# Patient Record
Sex: MALE | Race: WHITE | NOT HISPANIC OR LATINO | Employment: FULL TIME | URBAN - METROPOLITAN AREA
[De-identification: names, ages, dates, MRNs, and addresses within clinical notes are randomized per-mention and may not be internally consistent; named-entity substitution may affect disease eponyms.]

---

## 2021-08-11 ENCOUNTER — LAB (OUTPATIENT)
Dept: LAB | Facility: CLINIC | Age: 46
End: 2021-08-11

## 2021-08-11 DIAGNOSIS — M25.562 LEFT KNEE PAIN: Primary | ICD-10-CM

## 2021-08-11 LAB
APPEARANCE FLD: ABNORMAL
COLOR FLD: ABNORMAL
CRYSTALS SNV MICRO: NORMAL
EOSINOPHIL NFR FLD MANUAL: 2 %
GRAM STAIN RESULT: NORMAL
GRAM STAIN RESULT: NORMAL
LYMPHOCYTES NFR FLD MANUAL: 56 %
MONOS+MACROS NFR FLD MANUAL: 11 %
NEUTS BAND NFR FLD MANUAL: 31 %
RBC # FLD: ABNORMAL /UL
WBC # FLD AUTO: 272 /UL

## 2021-08-11 PROCEDURE — 87070 CULTURE OTHR SPECIMN AEROBIC: CPT

## 2021-08-11 PROCEDURE — 86618 LYME DISEASE ANTIBODY: CPT

## 2021-08-11 PROCEDURE — 89051 BODY FLUID CELL COUNT: CPT

## 2021-08-11 PROCEDURE — 87205 SMEAR GRAM STAIN: CPT

## 2021-08-11 PROCEDURE — 87075 CULTR BACTERIA EXCEPT BLOOD: CPT

## 2021-08-11 PROCEDURE — 89060 EXAM SYNOVIAL FLUID CRYSTALS: CPT | Mod: TC

## 2021-08-12 LAB — B BURGDOR IGG+IGM SER QL: 0.02

## 2021-08-14 LAB
BACTERIA FLD CULT: NO GROWTH
BACTERIA FLD CULT: NORMAL

## 2024-01-12 ENCOUNTER — HOSPITAL ENCOUNTER (EMERGENCY)
Facility: CLINIC | Age: 49
Discharge: HOME | End: 2024-01-12
Attending: EMERGENCY MEDICINE | Admitting: EMERGENCY MEDICINE
Payer: OTHER MISCELLANEOUS

## 2024-01-12 VITALS
DIASTOLIC BLOOD PRESSURE: 127 MMHG | SYSTOLIC BLOOD PRESSURE: 189 MMHG | RESPIRATION RATE: 16 BRPM | TEMPERATURE: 97.3 F | OXYGEN SATURATION: 93 % | WEIGHT: 302 LBS | HEIGHT: 70 IN | HEART RATE: 87 BPM | BODY MASS INDEX: 43.23 KG/M2

## 2024-01-12 DIAGNOSIS — S68.119A FINGER AMPUTATION, TRAUMATIC, INITIAL ENCOUNTER: ICD-10-CM

## 2024-01-12 DIAGNOSIS — I10 PRIMARY HYPERTENSION: ICD-10-CM

## 2024-01-12 PROCEDURE — 99284 EMERGENCY DEPT VISIT MOD MDM: CPT | Mod: 25 | Performed by: EMERGENCY MEDICINE

## 2024-01-12 PROCEDURE — 64450 NJX AA&/STRD OTHER PN/BRANCH: CPT | Performed by: EMERGENCY MEDICINE

## 2024-01-12 PROCEDURE — 250N000013 HC RX MED GY IP 250 OP 250 PS 637: Performed by: EMERGENCY MEDICINE

## 2024-01-12 RX ORDER — METFORMIN HCL 500 MG
500 TABLET, EXTENDED RELEASE 24 HR ORAL 2 TIMES DAILY WITH MEALS
Qty: 60 TABLET | Refills: 0 | Status: SHIPPED | OUTPATIENT
Start: 2024-01-12 | End: 2024-02-11

## 2024-01-12 RX ORDER — ATORVASTATIN CALCIUM 10 MG/1
10 TABLET, FILM COATED ORAL DAILY
Qty: 30 TABLET | Refills: 0 | Status: SHIPPED | OUTPATIENT
Start: 2024-01-12 | End: 2024-02-11

## 2024-01-12 RX ORDER — CHLORTHALIDONE 25 MG/1
25 TABLET ORAL DAILY
Qty: 30 TABLET | Refills: 0 | Status: SHIPPED | OUTPATIENT
Start: 2024-01-12 | End: 2024-02-11

## 2024-01-12 RX ORDER — ACETAMINOPHEN 500 MG
1000 TABLET ORAL
Status: COMPLETED | OUTPATIENT
Start: 2024-01-12 | End: 2024-01-12

## 2024-01-12 RX ORDER — HYDROMORPHONE HYDROCHLORIDE 2 MG/1
2 TABLET ORAL EVERY 6 HOURS PRN
Qty: 10 TABLET | Refills: 0 | Status: SHIPPED | OUTPATIENT
Start: 2024-01-12 | End: 2024-01-15

## 2024-01-12 RX ORDER — DOXYCYCLINE 100 MG/1
100 TABLET ORAL 2 TIMES DAILY
Qty: 14 TABLET | Refills: 0 | Status: SHIPPED | OUTPATIENT
Start: 2024-01-12 | End: 2024-01-19

## 2024-01-12 RX ADMIN — IBUPROFEN 600 MG: 400 TABLET ORAL at 09:25

## 2024-01-12 RX ADMIN — OXYCODONE HYDROCHLORIDE 2.5 MG: 5 TABLET ORAL at 11:02

## 2024-01-12 RX ADMIN — ACETAMINOPHEN 1000 MG: 500 TABLET, FILM COATED ORAL at 11:03

## 2024-01-12 ASSESSMENT — ENCOUNTER SYMPTOMS
GASTROINTESTINAL NEGATIVE: 1
NEUROLOGICAL NEGATIVE: 1
HEMATOLOGIC/LYMPHATIC NEGATIVE: 1
RESPIRATORY NEGATIVE: 1
ENDOCRINE NEGATIVE: 1
ALLERGIC/IMMUNOLOGIC NEGATIVE: 1
CONSTITUTIONAL NEGATIVE: 1
CARDIOVASCULAR NEGATIVE: 1
PSYCHIATRIC NEGATIVE: 1
EYES NEGATIVE: 1

## 2024-01-12 ASSESSMENT — ACTIVITIES OF DAILY LIVING (ADL): ADLS_ACUITY_SCORE: 35

## 2024-01-12 NOTE — DISCHARGE INSTRUCTIONS
1) You have amputated the distal tip of the left index finger in its entirety.  We discussed that reattachment of the tissue and nail was not practical.  After cleaning the wound and controlling bleeding to gauze dressing has been applied which should change at least daily.    2) For home you are placed on oral antibiotics to take twice a day over the next 1 week and pain medication as needed.  You can use Tylenol 1000 mg every 8 hours if needed and Motrin ibuprofen if able with food for 100 mg every 12 hours he also given pain medication Dilaudid to use every 6-8 hours if needed.    3) We have reviewed the importance of keeping the finger elevated.  To help facilitate wound care and follow-up care referral has been placed to the orthopedic hand surgery clinic for further assessment the next 1 to 2 weeks.    4) With your report that you have not been taking prescribed medications over the last 7 months he restarted any metformin currently the plan is for you to take 500 mg twice a day (with a plan to ramp up to 1000 mg twice a day however this should be done in the next 2 weeks after you have seen your clinic provider).  We also started on chlorthalidone and blood pressure medications to take 25 mg once a day and atorvastatin to take 10 mg once a day.  A primary care referral was placed for you to have follow-up in the next 2 to 3 weeks for medication refills and for follow-up care as discussed

## 2024-01-12 NOTE — LETTER
January 12, 2024      To Whom It May Concern:      Toro Juarez was seen in our Emergency Department today, 01/12/24. Please excuse him from missing work due to his injury needing to be evaluated the emergency department.  Further follow-up care is recommended and plan the next 1 to 2 weeks.  In the event he is unable to return to work his work note is valid until 1/24/24.  If there are new concerns or symptoms worsen he may need to return to be reevaluated.    Sincerely,             Phong Jaquez MD

## 2024-01-12 NOTE — ED TRIAGE NOTES
Pt states cut tip of finger off at work with metal sleeve.  Left pointer finger.  Bleeding controlled with pressure.    RN redressed wound and applied pressure.       Triage Assessment (Adult)       Row Name 01/12/24 0915          Triage Assessment    Airway WDL WDL        Respiratory WDL    Respiratory WDL WDL        Peripheral/Neurovascular WDL    Peripheral Neurovascular WDL WDL

## 2024-01-12 NOTE — ED PROVIDER NOTES
History     Chief Complaint   Patient presents with    Laceration     Pt states cut tip of finger off at work with metal sleeve.  Left pointer finger.  Bleeding controlled with pressure.      HPI  Toro Juarez is a 48 year old male who presented for care after sustaining a finger laceration.  On intake patient reports amputating the tip of his left index finger with a metal sleeve.    On examination patient reports that he was at a job site when he accidentally got his left index finger got caught in a metal sleeve at the job site.  Due to amputation bleeding he presented for wound care and further management Patient reports a history of hypertension on chlorthalidone , diabetes on metformin.  Patient reports that he has not been compliant with his prescribed medications due to life stressors including going through a divorce since April 2023.    Allergies:  Allergies   Allergen Reactions    Penicillins Swelling       Problem List:    There are no problems to display for this patient.       Past Medical History:    No past medical history on file.    Past Surgical History:    No past surgical history on file.    Family History:    No family history on file.    Social History:  Marital Status:   [4]  Social History     Tobacco Use    Smoking status: Former    Tobacco comments:     2003   Substance Use Topics    Alcohol use: Yes    Drug use: No        Medications:    atorvastatin (LIPITOR) 10 MG tablet  chlorthalidone (HYGROTON) 25 MG tablet  doxycycline monohydrate (ADOXA) 100 MG tablet  HYDROmorphone (DILAUDID) 2 MG tablet  metFORMIN (GLUCOPHAGE XR) 500 MG 24 hr tablet  ZYRTEC 10 MG OR TABS          Review of Systems   Constitutional: Negative.    HENT: Negative.     Eyes: Negative.    Respiratory: Negative.     Cardiovascular: Negative.    Gastrointestinal: Negative.    Endocrine: Negative.    Genitourinary: Negative.    Musculoskeletal:         Finger laceration with avulsion of distal tip   Skin:  "Negative.    Allergic/Immunologic: Negative.    Neurological: Negative.    Hematological: Negative.    Psychiatric/Behavioral: Negative.     All other systems reviewed and are negative.      Physical Exam   BP: (!) 224/125  Pulse: 87  Temp: 97.3  F (36.3  C)  Resp: 16  Height: 176.5 cm (5' 9.5\")  Weight: 137 kg (302 lb)  SpO2: 93 %      Physical Exam  Constitutional:       General: He is not in acute distress.     Appearance: He is not ill-appearing, toxic-appearing or diaphoretic.   HENT:      Head: Normocephalic and atraumatic.      Nose: Nose normal.   Eyes:      Extraocular Movements: Extraocular movements intact.      Pupils: Pupils are equal, round, and reactive to light.   Cardiovascular:      Rate and Rhythm: Normal rate and regular rhythm.   Musculoskeletal:         General: Swelling, tenderness and signs of injury present.      Left hand: Laceration (avulsed the distal tip of the left index finger including the nail in its entirety) present.        Arms:       Cervical back: Normal range of motion and neck supple.   Skin:     Capillary Refill: Capillary refill takes less than 2 seconds.      Coloration: Skin is not jaundiced or pale.      Findings: No bruising, erythema, lesion or rash.   Neurological:      General: No focal deficit present.      Mental Status: He is alert and oriented to person, place, and time.      Cranial Nerves: No cranial nerve deficit.      Sensory: No sensory deficit.      Motor: No weakness.      Coordination: Coordination normal.      Gait: Gait normal.      Deep Tendon Reflexes: Reflexes normal.   Psychiatric:         Mood and Affect: Mood normal.         Behavior: Behavior normal.         Thought Content: Thought content normal.         Judgment: Judgment normal.         ED Course                 Procedures              Critical Care time:  none             ED medications:  Medications   ibuprofen (ADVIL/MOTRIN) tablet 600 mg (600 mg Oral $Given 1/12/24 8152)   acetaminophen " "(TYLENOL) tablet 1,000 mg (1,000 mg Oral $Given 1/12/24 1103)   oxyCODONE IR (ROXICODONE) half-tab 2.5 mg (2.5 mg Oral $Given 1/12/24 1102)     ED Vitals:  Vitals:    01/12/24 0913 01/12/24 0920 01/12/24 1108   BP:  (!) 224/125 (!) 189/127   Pulse: 87  87   Resp: 16     Temp: 97.3  F (36.3  C)     TempSrc: Tympanic     SpO2: 93%     Weight: 137 kg (302 lb)     Height: 1.765 m (5' 9.5\")           ED labs and imaging: none    Assessments & Plan (with Medical Decision Making)   Assessment Summary and Clinical Impression: 48 year-old male right-hand-dominant who presented for wound care after sustaining amputation of the distal tip of his left index finger while at work from a metal sleeve. Patient's blood pressure was captured to be 224/125 on arrival  Patient admitted that he been noncompliant with his medications including his antihypertensive -chlorthalidone.  He reported a headache and was offered Tylenol and ibuprofen. We discussed headache as a primary symptom of untreated hypertension.  History and exam did not raise concern for intracranial hemorrhage or more significant sequela from untreated hypertension.  The amputated tip was not reattached after our discussion. Bleeding was controlled direct pressure.   After adequate anesthesia with digital block with 0.5% bupivacaine with epinephrine the finger was washed in warm water and Hibiclens. Applied surgicel lubricated with bacitracin to the distal tip.  Bleeding was controlled after application of  tube gauze dressing.  We discussed follow-up in orthopedic hand clinic for further wound care management needs if needed.  He was encouraged to resume his metformin, atorvastatin, and chlorthalidone (new prescription provided). We discussed risk of untreated hypertension.    ED course and plan:  Reviewed the medical record. After adequate anesthesia with digital block with 0.5% lidocaine with epinephrine-5ml.  Bleeding was controlled.  Wound was exposed and cleaned.  " Complete amputation.  No rongeuring required. Tube gauze dressing was applied with Surgicel and bacitracin.  He was discharged with doxycyline twice daily x 7 days, and offered Dilaudid for pain in addition to over-the-counter medications as needed. Placed an orthopedic  referral for hand surgery to help with further assessment in the next 1 to 2 weeks.    During his visit his blood pressure was rechecked and remained elevated.  We reviewed risk of untreated hypertension in lieu of his current life stressors. Patient was able to confirm that he had been prescribed chlorthalidone 25 mg daily, atorvastatin 10 mg daily and metformin 2 g daily.  He reported that he had not taken any of his medications for the last 7 months due to his pending divorce.  Patient was restarted on chlorthalidone 25 mg daily, atorvastatin 10 g daily and metformin 500 mg twice a day with a plan to ramp up his metformin in the next 2 weeks.  To help facilitate medication management and to help establish primary care placed a primary care clinic referral for follow-up care and medication management needs..  Patient expressed gratitude for his care and comfort with the plan of care.       Disclaimer: This note consists of symbols derived from keyboarding, dictation and/or voice recognition software. As a result, there may be errors in the script that have gone undetected. Please consider this when interpreting information found in this chart.   I have reviewed the nursing notes.    I have reviewed the findings, diagnosis, plan and need for follow up with the patient.           Medical Decision Making  The patient's presentation was of moderate complexity (an acute complicated injury).    The patient's evaluation involved:  ordering and/or review of 1 test(s) in this encounter (wound care and tissue management)    The patient's management necessitated moderate risk (prescription drug management including medications given in the  ED).        Discharge Medication List as of 1/12/2024 11:41 AM        START taking these medications    Details   atorvastatin (LIPITOR) 10 MG tablet Take 1 tablet (10 mg) by mouth daily for 30 days, Disp-30 tablet, R-0, E-Prescribe      chlorthalidone (HYGROTON) 25 MG tablet Take 1 tablet (25 mg) by mouth daily for 30 days, Disp-30 tablet, R-0, E-Prescribe      doxycycline monohydrate (ADOXA) 100 MG tablet Take 1 tablet (100 mg) by mouth 2 times daily for 7 days, Disp-14 tablet, R-0, E-Prescribe      HYDROmorphone (DILAUDID) 2 MG tablet Take 1 tablet (2 mg) by mouth every 6 hours as needed for pain, Disp-10 tablet, R-0, E-Prescribe      metFORMIN (GLUCOPHAGE XR) 500 MG 24 hr tablet Take 1 tablet (500 mg) by mouth 2 times daily (with meals) for 30 days, Disp-60 tablet, R-0, E-Prescribe             Final diagnoses:   Finger amputation, traumatic, initial encounter - By metal sleeve at work.  Distal tip avulsed and amputated of left index finger   Primary hypertension - Currently untreated       1/12/2024   [facility] EMERGENCY DEPT       Karlos Jaquez MD  01/12/24 4286